# Patient Record
Sex: MALE | Race: OTHER | NOT HISPANIC OR LATINO | Employment: FULL TIME | URBAN - NONMETROPOLITAN AREA
[De-identification: names, ages, dates, MRNs, and addresses within clinical notes are randomized per-mention and may not be internally consistent; named-entity substitution may affect disease eponyms.]

---

## 2020-09-28 ENCOUNTER — HISTORICAL (OUTPATIENT)
Dept: ADMINISTRATIVE | Facility: HOSPITAL | Age: 25
End: 2020-09-28

## 2020-10-13 ENCOUNTER — HISTORICAL (OUTPATIENT)
Dept: ADMINISTRATIVE | Facility: HOSPITAL | Age: 25
End: 2020-10-13

## 2021-02-11 ENCOUNTER — HISTORICAL (OUTPATIENT)
Dept: RADIOLOGY | Facility: HOSPITAL | Age: 26
End: 2021-02-11

## 2023-07-18 ENCOUNTER — TELEPHONE (OUTPATIENT)
Dept: UROLOGY | Facility: CLINIC | Age: 28
End: 2023-07-18
Payer: COMMERCIAL

## 2023-07-18 NOTE — TELEPHONE ENCOUNTER
Attempted to return call. Left VM.     ----- Message from Renetta Reeder sent at 7/18/2023  1:10 PM CDT -----  Contact: pt wife  Pt wife is asking that the np paperwork for pt be sent [phone] . So that it can be filled out prior to appt .

## 2023-07-20 ENCOUNTER — OFFICE VISIT (OUTPATIENT)
Dept: UROLOGY | Facility: CLINIC | Age: 28
End: 2023-07-20
Payer: COMMERCIAL

## 2023-07-20 VITALS
HEIGHT: 72 IN | SYSTOLIC BLOOD PRESSURE: 125 MMHG | OXYGEN SATURATION: 99 % | WEIGHT: 200 LBS | BODY MASS INDEX: 27.09 KG/M2 | DIASTOLIC BLOOD PRESSURE: 73 MMHG | HEART RATE: 63 BPM

## 2023-07-20 DIAGNOSIS — Z30.2 ENCOUNTER FOR MALE STERILIZATION PROCEDURE: Primary | ICD-10-CM

## 2023-07-20 PROCEDURE — 1159F MED LIST DOCD IN RCRD: CPT | Mod: CPTII,S$GLB,, | Performed by: UROLOGY

## 2023-07-20 PROCEDURE — 1159F PR MEDICATION LIST DOCUMENTED IN MEDICAL RECORD: ICD-10-PCS | Mod: CPTII,S$GLB,, | Performed by: UROLOGY

## 2023-07-20 PROCEDURE — 3078F PR MOST RECENT DIASTOLIC BLOOD PRESSURE < 80 MM HG: ICD-10-PCS | Mod: CPTII,S$GLB,, | Performed by: UROLOGY

## 2023-07-20 PROCEDURE — 3074F SYST BP LT 130 MM HG: CPT | Mod: CPTII,S$GLB,, | Performed by: UROLOGY

## 2023-07-20 PROCEDURE — 99204 PR OFFICE/OUTPT VISIT, NEW, LEVL IV, 45-59 MIN: ICD-10-PCS | Mod: S$GLB,,, | Performed by: UROLOGY

## 2023-07-20 PROCEDURE — 1160F RVW MEDS BY RX/DR IN RCRD: CPT | Mod: CPTII,S$GLB,, | Performed by: UROLOGY

## 2023-07-20 PROCEDURE — 3078F DIAST BP <80 MM HG: CPT | Mod: CPTII,S$GLB,, | Performed by: UROLOGY

## 2023-07-20 PROCEDURE — 1160F PR REVIEW ALL MEDS BY PRESCRIBER/CLIN PHARMACIST DOCUMENTED: ICD-10-PCS | Mod: CPTII,S$GLB,, | Performed by: UROLOGY

## 2023-07-20 PROCEDURE — 3008F PR BODY MASS INDEX (BMI) DOCUMENTED: ICD-10-PCS | Mod: CPTII,S$GLB,, | Performed by: UROLOGY

## 2023-07-20 PROCEDURE — 99204 OFFICE O/P NEW MOD 45 MIN: CPT | Mod: S$GLB,,, | Performed by: UROLOGY

## 2023-07-20 PROCEDURE — 3008F BODY MASS INDEX DOCD: CPT | Mod: CPTII,S$GLB,, | Performed by: UROLOGY

## 2023-07-20 PROCEDURE — 3074F PR MOST RECENT SYSTOLIC BLOOD PRESSURE < 130 MM HG: ICD-10-PCS | Mod: CPTII,S$GLB,, | Performed by: UROLOGY

## 2023-07-20 RX ORDER — DEXTROAMPHETAMINE SACCHARATE, AMPHETAMINE ASPARTATE, DEXTROAMPHETAMINE SULFATE AND AMPHETAMINE SULFATE 2.5; 2.5; 2.5; 2.5 MG/1; MG/1; MG/1; MG/1
TABLET ORAL
COMMUNITY

## 2023-07-20 NOTE — PROGRESS NOTES
Subjective:       Patient ID: Chase Fernandez is a 28 y.o. male.    Chief Complaint: Sterilization and Advice Only      HPI: 28-year-old male patient presenting to the clinic to establish care requesting vasectomy consult.  He is 3 children and denies any issues at this time.       Past Medical History:   Past Medical History:   Diagnosis Date    ADD (attention deficit disorder)        Past Surgical Historical:   Past Surgical History:   Procedure Laterality Date    HAND SURGERY Left         Medications:   Medication List with Changes/Refills   Current Medications    DEXTROAMPHETAMINE-AMPHETAMINE (ADDERALL) 10 MG TAB    Take by mouth.        Past Social History:   Social History     Socioeconomic History    Marital status:    Tobacco Use    Smoking status: Never     Passive exposure: Never    Smokeless tobacco: Never   Substance and Sexual Activity    Alcohol use: Yes     Alcohol/week: 2.0 standard drinks     Types: 2 Cans of beer per week    Drug use: Never    Sexual activity: Yes       Allergies: Review of patient's allergies indicates:  No Known Allergies     Family History:   Family History   Problem Relation Age of Onset    COPD Father         Review of Systems:  Review of Systems   Constitutional: Negative.    HENT: Negative.     Eyes: Negative.    Respiratory: Negative.     Cardiovascular: Negative.    Gastrointestinal: Negative.    Endocrine: Negative.    Genitourinary: Negative.    Musculoskeletal: Negative.    Skin: Negative.    Allergic/Immunologic: Negative.    Neurological: Negative.    Hematological: Negative.    Psychiatric/Behavioral: Negative.       Physical Exam:  Physical Exam  Constitutional:       Appearance: He is normal weight.   HENT:      Head: Normocephalic.      Nose: Nose normal.      Mouth/Throat:      Mouth: Mucous membranes are moist.      Pharynx: Oropharynx is clear.   Eyes:      Extraocular Movements: Extraocular movements intact.      Conjunctiva/sclera: Conjunctivae normal.       Pupils: Pupils are equal, round, and reactive to light.   Cardiovascular:      Rate and Rhythm: Normal rate and regular rhythm.      Pulses: Normal pulses.      Heart sounds: Normal heart sounds.   Pulmonary:      Effort: Pulmonary effort is normal.      Breath sounds: Normal breath sounds.   Abdominal:      General: Abdomen is flat. Bowel sounds are normal.      Palpations: Abdomen is soft.      Hernia: There is no hernia in the right inguinal area or left inguinal area.   Genitourinary:     Penis: Normal. No phimosis, paraphimosis, hypospadias, erythema, tenderness or discharge.       Testes: Normal.         Right: Mass, tenderness or swelling not present. Right testis is descended. Cremasteric reflex is present.          Left: Mass, tenderness or swelling not present. Left testis is descended. Cremasteric reflex is present.       Prostate: Normal.      Rectum: Normal.   Musculoskeletal:         General: Normal range of motion.      Cervical back: Normal range of motion and neck supple.   Lymphadenopathy:      Lower Body: No right inguinal adenopathy. No left inguinal adenopathy.   Skin:     General: Skin is warm and dry.      Capillary Refill: Capillary refill takes less than 2 seconds.   Neurological:      General: No focal deficit present.      Mental Status: He is alert and oriented to person, place, and time. Mental status is at baseline.   Psychiatric:         Mood and Affect: Mood normal.         Behavior: Behavior normal.         Thought Content: Thought content normal.         Judgment: Judgment normal.       Assessment/Plan:       Vasectomy:  We had a long discussion regarding vasectomy including the risks and benefits.  The patient understands the permanent nature of the procedure he also understands the potential risks of vasectomy including but not limited to injury to the testicle loss of testicle bleeding hematoma infection and testicular atrophy.  Patient understands these risks is willing to  proceed we will schedule vasectomy next available date.    I, Dr. Ishmael Mane have seen and personally evaluated the patient. I have formulated the plan reviewed all pertinent imaging and clinical data.  I agree with the nurse practitioner's assessment, and I have personally formulated the plan for this patient's care as described by the midlevel.    Problem List Items Addressed This Visit    None  Visit Diagnoses       Encounter for male sterilization procedure    -  Primary

## 2023-11-01 ENCOUNTER — CLINICAL SUPPORT (OUTPATIENT)
Dept: UROLOGY | Facility: CLINIC | Age: 28
End: 2023-11-01
Payer: COMMERCIAL

## 2023-11-01 DIAGNOSIS — Z30.2 ENCOUNTER FOR MALE STERILIZATION PROCEDURE: Primary | ICD-10-CM

## 2023-11-01 NOTE — PROGRESS NOTES
Consents and education completed for vasectomy at [facility] on 11/21/23. Pt given highlighted written instructions. Pt had no questions nor concerns, I advised he could call or message us via the portal if he did. Pt states understanding of all verbal and written instructions.

## 2023-11-02 RX ORDER — DIAZEPAM 10 MG/1
10 TABLET ORAL ONCE
Qty: 1 TABLET | Refills: 0 | Status: SHIPPED | OUTPATIENT
Start: 2023-11-02 | End: 2023-11-02

## 2023-11-16 ENCOUNTER — TELEPHONE (OUTPATIENT)
Dept: UROLOGY | Facility: CLINIC | Age: 28
End: 2023-11-16
Payer: COMMERCIAL

## 2023-11-16 NOTE — TELEPHONE ENCOUNTER
Pt calling regards to valium for procedure. Informed pt we have sent it out to [facility] and that he can pick it up. Pt will call back with any issues.     ----- Message from Kaci Rodriguez sent at 11/16/2023  8:18 AM CST -----  Patient wife is calling in regards to medication for procedure..Please call her back at [phone].              Thanks  jose c

## 2023-11-20 ENCOUNTER — TELEPHONE (OUTPATIENT)
Dept: UROLOGY | Facility: CLINIC | Age: 28
End: 2023-11-20
Payer: COMMERCIAL

## 2023-11-20 NOTE — TELEPHONE ENCOUNTER
Appointment confirmed. Arrival time of 1 PM given. Instructed to take valium 10 mg 30 minutes prior to arrival, wear jock strap to appointment & to have a  with him. Verbalized understanding.

## 2023-11-21 ENCOUNTER — PROCEDURE VISIT (OUTPATIENT)
Dept: UROLOGY | Facility: CLINIC | Age: 28
End: 2023-11-21
Payer: COMMERCIAL

## 2023-11-21 VITALS
WEIGHT: 204.38 LBS | HEART RATE: 68 BPM | SYSTOLIC BLOOD PRESSURE: 131 MMHG | RESPIRATION RATE: 20 BRPM | OXYGEN SATURATION: 100 % | BODY MASS INDEX: 28.61 KG/M2 | DIASTOLIC BLOOD PRESSURE: 79 MMHG | HEIGHT: 71 IN

## 2023-11-21 DIAGNOSIS — Z30.2 ENCOUNTER FOR MALE STERILIZATION PROCEDURE: Primary | ICD-10-CM

## 2023-11-21 PROCEDURE — 55250 VASECTOMY: ICD-10-PCS | Mod: S$GLB,,, | Performed by: UROLOGY

## 2023-11-21 PROCEDURE — 55250 REMOVAL OF SPERM DUCT(S): CPT | Mod: S$GLB,,, | Performed by: UROLOGY

## 2023-11-21 NOTE — PATIENT INSTRUCTIONS
NO STRENUOUS ACTIVITY FOR 7 DAYS  NO SEXUAL INTERCOURSE FOR 7-14 DAYS  Patient Education       Vasectomy Discharge Instructions   About this topic   A vasectomy is a permanent type of birth control. After this surgery, you are very unlikely to get a person pregnant. Sperm are made in the testes. The testes are small round organs located in the skin sac that hangs between your legs. Sperm are stored in a small organ on top of the testes. The organ is called the epididymis. The sperm travel from the epididymis through a small tube called the vas deferens when you ejaculate. The fluid you ejaculate is called semen. The sperm in this fluid can cause a pregnancy.  The doctor cuts or blocks the vas deferens during a vasectomy. After this procedure, you will still ejaculate but the semen will not contain any sperm.     What care is needed at home?   Ask your doctor what you need to do when you go home. Make sure you ask questions if you do not understand what the doctor says. Asking questions will help you know what you need to do.  Talk to your doctor about how to care for your cut sites. Ask your doctor about:  When you should change your bandages  When you may take a bath or shower  If you need to be careful with lifting things over 10 pounds (4.5 kg)  When you can go back to your normal activities like work and driving.  Always wash your hands before and after touching your wound or dressing.  Place an ice pack or a bag of frozen peas wrapped in a towel over the painful part. Never put ice right on the skin. Do not leave the ice on more than 10 to 15 minutes at a time.  Wear supportive clothing, like a jockstrap or jockey shorts, while your wound heals.  Relax and keep your feet raised when you get home. Lying on your back may feel most comfortable.  You may have blood in your semen for the first few weeks. This is normal.  You may have sex after 1 to 4 weeks or when your doctor says so. You can still get your partner  pregnant during this time. You need to use other forms of birth control to prevent pregnancy.  You may have to wait up to 3 months to have a zero sperm count. You will need to get your sperm count checked. Use birth control until your sperm count is checked and is zero.  What follow-up care is needed?   Your doctor may ask you to make visits to the office to check on your progress. Be sure to keep your visits.  You may have stitches or staples. If so, your doctor will often want to remove the stitches or staples in 1 to 2 weeks.  You will need to follow up with your doctor to have your sperm count checked after about 3 months. Checking with your doctor is an important step in making sure you are not able to get a person pregnant.  What drugs may be needed?   The doctor may order drugs to:  Help with pain and swelling  Prevent infection  Will physical activity be limited?   You may need to rest for a few days after the procedure. Avoid standing or walking too long. Avoid heavy lifting and hard exercise for up to 1 to 3 weeks.  What problems could happen?   Infection  Bleeding and bruising  Swelling  Pain  Opening of surgical wound  Blood supply to the testicle is damaged  There are still sperm in your semen  When do I need to call the doctor?   Signs of infection such as a fever of 100.4°F (38°C) or higher, chills, pain with passing urine.  Signs of wound infection such as swelling, redness, warmth around the wound; too much pain when touched; yellowish, greenish, or bloody discharge; foul smell coming from the cut site; cut site opens up.  Very bad pain in your scrotum  Problems with your erection or ejaculation  You are not feeling better in 2 to 3 days or you are feeling worse  Helpful tips   A vasectomy is meant to be a permanent form of contraception. You should be 100% sure before you undergo this procedure. You and your partner should have talked about this procedure thoroughly. Vasectomy reversal surgery is  available, but it is complicated and does not always work.  A vasectomy does not protect you from sexually-transmitted diseases. You will still need to wear a condom to protect yourself and your partner against these infections.  Teach Back: Helping You Understand   The Teach Back Method helps you understand the information we are giving you. After you talk with the staff, tell them in your own words what you learned. This helps to make sure the staff has described each thing clearly. It also helps to explain things that may have been confusing. Before going home, make sure you can do these:  I can tell you about my procedure.  I can tell you what may help ease my pain.  I can tell you how to care for my cut site.  I can tell you what I will do if I have a fever, chills, or bad pain.  Where can I learn more?   American Academy [employer]  https://familydoctor.org/vasectomy-what-to-expect/   [employer]  https://www.betterhealth.[place]/health/healthyliving/contraception-vasectomy   NHS Choices  https://www.nhs.uk/Conditions/contraception-guide/Pages/vasectomy-male-sterilisation.aspx   Last Reviewed Date   2021-06-07  Consumer Information Use and Disclaimer   This information is not specific medical advice and does not replace information you receive from your health care provider. This is only a brief summary of general information. It does NOT include all information about conditions, illnesses, injuries, tests, procedures, treatments, therapies, discharge instructions or life-style choices that may apply to you. You must talk with your health care provider for complete information about your health and treatment options. This information should not be used to decide whether or not to accept your health care providers advice, instructions or recommendations. Only your health care provider has the knowledge and training to provide advice that is right for you.  Copyright   Copyright © 2021  [employer] and its affiliates and/or licensors. All rights reserved.

## 2023-11-21 NOTE — PROCEDURES
"Vasectomy    Date/Time: 11/21/2023 1:30 PM    Performed by: Ishmael Mane MD  Authorized by: Vijaya Blankenship NP    Time out: Immediately prior to procedure a "time out" was called to verify the correct patient, procedure, equipment, support staff and site/side marked as required.    Indications:  [place] male  Patient sedated: No    Preparation: Patient was prepped and draped in usual sterile fashion    Incisions:  1  Length vas excised:  2.5 cm  Vas:  Fulgurated and Buried  Sutures:  3-0 chromic SH  Same procedure performed on both sides    Patient tolerance:  Patient tolerated the procedure well with no immediate complications     Patient was brought to the procedure room placed on table in supine position he was then prepped and draped in the usual sterile fashion. A 2 cm incision was made upper portion of the midline of the scrotum secured into the dartos fascia of the right vas deferens was isolated and skeletonized using the Vas clamps, and at this point a 2 cm segment of the vas deferens was excised, both ends of the vas were fulgurated the proximal end was dunked into the surrounding dartos and pursestring closed the that is on the right side was returned to the scrotum the identical procedure was done on the contralateral side the incision was closed with a 3 O chromic suture patient tolerated the procedure with oral complications.    "

## 2024-09-03 ENCOUNTER — TELEPHONE (OUTPATIENT)
Dept: UROLOGY | Facility: CLINIC | Age: 29
End: 2024-09-03
Payer: COMMERCIAL

## 2024-10-14 ENCOUNTER — TELEPHONE (OUTPATIENT)
Dept: UROLOGY | Facility: CLINIC | Age: 29
End: 2024-10-14
Payer: COMMERCIAL

## 2024-10-14 NOTE — TELEPHONE ENCOUNTER
Pt notified that he is now sterile.----- Message from Vijaya Blankenship NP sent at 10/14/2024  3:12 PM CDT -----  Please notify patient he is sterile based on semen analysis results.